# Patient Record
Sex: FEMALE | Race: WHITE | URBAN - METROPOLITAN AREA
[De-identification: names, ages, dates, MRNs, and addresses within clinical notes are randomized per-mention and may not be internally consistent; named-entity substitution may affect disease eponyms.]

---

## 2020-12-16 VITALS — WEIGHT: 230 LBS | BODY MASS INDEX: 36.96 KG/M2 | HEIGHT: 66 IN

## 2020-12-16 PROBLEM — F41.8 MIXED ANXIETY AND DEPRESSIVE DISORDER: Status: ACTIVE | Noted: 2020-12-16

## 2020-12-16 RX ORDER — SERTRALINE HYDROCHLORIDE 50 MG/1
TABLET, FILM COATED ORAL DAILY
COMMUNITY
End: 2020-12-24 | Stop reason: SDUPTHER

## 2020-12-16 RX ORDER — PREDNISONE 20 MG/1
TABLET ORAL
COMMUNITY
End: 2021-01-15

## 2020-12-21 DIAGNOSIS — F41.8 OTHER SPECIFIED ANXIETY DISORDERS: ICD-10-CM

## 2020-12-21 RX ORDER — SERTRALINE HYDROCHLORIDE 50 MG/1
TABLET, FILM COATED ORAL
Qty: 90 TAB | Refills: 1 | OUTPATIENT
Start: 2020-12-21

## 2020-12-24 RX ORDER — SERTRALINE HYDROCHLORIDE 50 MG/1
50 TABLET, FILM COATED ORAL DAILY
Qty: 30 TAB | Refills: 0 | Status: SHIPPED | OUTPATIENT
Start: 2020-12-24 | End: 2021-01-15 | Stop reason: SDUPTHER

## 2021-01-15 ENCOUNTER — VIRTUAL VISIT (OUTPATIENT)
Dept: PRIMARY CARE CLINIC | Age: 41
End: 2021-01-15
Payer: COMMERCIAL

## 2021-01-15 DIAGNOSIS — F41.8 MIXED ANXIETY AND DEPRESSIVE DISORDER: Primary | Chronic | ICD-10-CM

## 2021-01-15 PROCEDURE — 99213 OFFICE O/P EST LOW 20 MIN: CPT | Performed by: FAMILY MEDICINE

## 2021-01-15 RX ORDER — SERTRALINE HYDROCHLORIDE 50 MG/1
75 TABLET, FILM COATED ORAL DAILY
Qty: 135 TAB | Refills: 1 | Status: SHIPPED | OUTPATIENT
Start: 2021-01-15 | End: 2021-12-13

## 2021-01-15 RX ORDER — SERTRALINE HYDROCHLORIDE 50 MG/1
50 TABLET, FILM COATED ORAL DAILY
Qty: 90 TAB | Refills: 1 | Status: SHIPPED | OUTPATIENT
Start: 2021-01-15 | End: 2021-01-15

## 2021-01-15 NOTE — PROGRESS NOTES
HISTORY OF PRESENT ILLNESS  Bennie Hdz is a 36 y.o. female   THIS IS  VIDEO VISIT  OCCURRED WITH CONSENT FORM THE PATIENT AND PERFORMED THROUGH THE SECURE WEBSITE [employer]. ME.     Needs refills for sertraline , works well since restarted the zoloft. No SI, HI. Not seeing a counselor but feels she is coping well  A bit  unsettled bc of world events. Teaches online now. not as rested but its just a lot to do. Put on weight and  On the computer and home has made her snack more. Plans to take covid vaccine when it comes out. Prior to Admission medications    Medication Sig Start Date End Date Taking? Authorizing Provider   sertraline (ZOLOFT) 50 mg tablet Take 1.5 Tabs by mouth daily. 1/15/21  Yes Silverio Enrique MD        Past Medical History:   Diagnosis Date    Depression      Social History     Tobacco Use    Smoking status: Never Smoker    Smokeless tobacco: Never Used   Substance Use Topics    Alcohol use: Not on file    Drug use: Not on file       Family History   Problem Relation Age of Onset    Diabetes Mother     Cancer Father     Crohn's Disease Father        Review of Systems   Constitutional: Negative for chills, fever, malaise/fatigue and weight loss. Respiratory: Negative for cough and shortness of breath. Cardiovascular: Negative for chest pain and palpitations. Gastrointestinal: Negative for abdominal pain and heartburn. Musculoskeletal: Negative for myalgias. Skin: Negative for rash. Neurological: Negative for dizziness, tingling, tremors, speech change, weakness and headaches. Psychiatric/Behavioral: Positive for depression. Negative for memory loss and suicidal ideas. The patient is nervous/anxious. The patient does not have insomnia. There were no vitals taken for this visit. Physical Exam  Nursing note reviewed. Constitutional:       Appearance: Normal appearance. She is obese. HENT:      Head: Normocephalic and atraumatic. Eyes:      Extraocular Movements: Extraocular movements intact. Conjunctiva/sclera: Conjunctivae normal.   Pulmonary:      Effort: Pulmonary effort is normal. No respiratory distress. Abdominal:      Palpations: Abdomen is soft. Neurological:      General: No focal deficit present. Mental Status: She is alert and oriented to person, place, and time. Psychiatric:         Mood and Affect: Mood normal.         Behavior: Behavior normal.         Thought Content: Thought content normal.         Judgment: Judgment normal.         ASSESSMENT and PLAN  Diagnoses and all orders for this visit:    1. Mixed anxiety and depressive disorder  Comments:  Incerase to 75 mg zoloft.   suggested counseling to see if helps. Overall has improved    Other orders  -     sertraline (ZOLOFT) 50 mg tablet; Take 1.5 Tabs by mouth daily.          Orders Placed This Encounter    DISCONTD: sertraline (ZOLOFT) 50 mg tablet    sertraline (ZOLOFT) 50 mg tablet

## 2021-12-13 ENCOUNTER — OFFICE VISIT (OUTPATIENT)
Dept: PRIMARY CARE CLINIC | Age: 41
End: 2021-12-13
Payer: COMMERCIAL

## 2021-12-13 VITALS
HEIGHT: 66 IN | RESPIRATION RATE: 14 BRPM | SYSTOLIC BLOOD PRESSURE: 136 MMHG | DIASTOLIC BLOOD PRESSURE: 88 MMHG | WEIGHT: 254 LBS | BODY MASS INDEX: 40.82 KG/M2 | OXYGEN SATURATION: 98 % | HEART RATE: 99 BPM

## 2021-12-13 DIAGNOSIS — Z00.00 PREVENTATIVE HEALTH CARE: ICD-10-CM

## 2021-12-13 DIAGNOSIS — F41.8 MIXED ANXIETY AND DEPRESSIVE DISORDER: ICD-10-CM

## 2021-12-13 DIAGNOSIS — Z23 ENCOUNTER FOR IMMUNIZATION: Primary | ICD-10-CM

## 2021-12-13 DIAGNOSIS — Z76.89 ENCOUNTER TO ESTABLISH CARE: ICD-10-CM

## 2021-12-13 PROCEDURE — 99214 OFFICE O/P EST MOD 30 MIN: CPT

## 2021-12-13 PROCEDURE — 90686 IIV4 VACC NO PRSV 0.5 ML IM: CPT

## 2021-12-13 PROCEDURE — 90472 IMMUNIZATION ADMIN EACH ADD: CPT

## 2021-12-13 PROCEDURE — 90715 TDAP VACCINE 7 YRS/> IM: CPT

## 2021-12-13 PROCEDURE — 90471 IMMUNIZATION ADMIN: CPT

## 2021-12-13 RX ORDER — SERTRALINE HYDROCHLORIDE 50 MG/1
75 TABLET, FILM COATED ORAL DAILY
Qty: 135 TABLET | Refills: 1 | Status: SHIPPED | OUTPATIENT
Start: 2021-12-13 | End: 2022-07-20 | Stop reason: SDUPTHER

## 2021-12-13 NOTE — PROGRESS NOTES
HPI     Chief Complaint   Patient presents with    New Patient    Medication Refill     zoloft        HPI:  Sonam Graff is a 39 y.o. female who presents the office today for physical and prescription refill. Mixed anxiety and depressive disorder: Depression PHQ9 score: 5 Mild Depression. Not taking meds/ran out of medication 30 days ago. No side effects. Not attending psychotherapy. Establish GYN Services: Patient wanting to establish local GYN services. [place] [address] [place] [address] [place], [address] [facility] Last PDMP Nadir Flores as Reviewed:  Review User Review Instant Review Result   Manuel Maki 12/13/2021  8:25 PM Reviewed PDMP [1]       No Known Allergies    Current Outpatient Medications   Medication Sig    sertraline (ZOLOFT) 50 mg tablet Take 1.5 Tablets by mouth daily. No current facility-administered medications for this visit. Review of Systems   Constitutional: Negative for chills, fever, malaise/fatigue and weight loss. Eyes: Negative for blurred vision and double vision. Respiratory: Negative for cough, shortness of breath and wheezing. Cardiovascular: Negative for chest pain, palpitations and leg swelling. Gastrointestinal: Negative for diarrhea, heartburn, nausea and vomiting. Genitourinary: Negative for dysuria. Musculoskeletal: Negative for joint pain and myalgias. Skin: Negative for itching and rash. Neurological: Negative for dizziness, tingling, loss of consciousness, weakness and headaches. Endo/Heme/Allergies: Does not bruise/bleed easily. Psychiatric/Behavioral: Negative for depression. The patient is not nervous/anxious. Reviewed PmHx, FmHx, SocHx as well as meds and allergies, updated and dated in the chart.          Objective     Visit Vitals  /88 (BP 1 Location: Left upper arm, BP Patient Position: Sitting)   Pulse 99   Resp 14   Ht 5' 6\" (1.676 m)   Wt 254 lb (115.2 kg)   SpO2 98%   BMI 41.00 kg/m²     Physical Exam  Constitutional: Appearance: Normal appearance. She is normal weight. HENT:      Head: Normocephalic. Eyes:      Extraocular Movements: Extraocular movements intact. Conjunctiva/sclera: Conjunctivae normal.      Pupils: Pupils are equal, round, and reactive to light. Cardiovascular:      Rate and Rhythm: Normal rate and regular rhythm. Pulses: Normal pulses. Heart sounds: Normal heart sounds. Pulmonary:      Effort: Pulmonary effort is normal.      Breath sounds: Normal breath sounds. Musculoskeletal:         General: Normal range of motion. Cervical back: Normal range of motion and neck supple. Skin:     General: Skin is warm and dry. Neurological:      General: No focal deficit present. Mental Status: She is alert and oriented to person, place, and time. Psychiatric:         Mood and Affect: Mood is anxious. Speech: Speech normal.         Behavior: Behavior is cooperative. Thought Content: Thought content normal. Thought content does not include homicidal or suicidal ideation. Cognition and Memory: Cognition normal.             Assessment and Plan     Diagnoses and all orders for this visit:    1. Encounter for immunization  -     TETANUS, DIPHTHERIA TOXOIDS AND ACELLULAR PERTUSSIS VACCINE (TDAP), IN INDIVIDS. >=7, IM  -     INFLUENZA VIRUS VAC QUAD,SPLIT,PRESV FREE SYRINGE IM    2. Mixed anxiety and depressive disorder  Assessment & Plan:   uncontrolled, continue current medications pending work up below. Patient has been out of her medications for one month. Has a good support system and is able to seek help when needed. Patient feels well controlled on medications. Plan: Refill Zoloft prescription. Patient has office number in the event condition worsens. Orders:  -     sertraline (ZOLOFT) 50 mg tablet; Take 1.5 Tablets by mouth daily. 3. Encounter to establish care -        METABOLIC PANEL, COMPREHENSIVE; Future  -     CBC W/O DIFF; Future    4. Preventative health care  -     REFERRAL TO GYNECOLOGWants to establish current and future GYN  services near her home in [place], [place] if possible  -     REFERRAL TO OPTOMETRY: Patient to review current insurance and list of  local participating providers. Medication Side Effects and Warnings were discussed with patient. Patient Labs were reviewed and or requested. Patient Past Records were reviewed and or requested. Follow-up and Dispositions    · Return in about 6 months (around 6/13/2022). I have discussed the diagnosis with the patient and the intended plan as seen in the above orders. The patient has received an after-visit summary and questions were answered concerning future plans. I have discussed medication side effects and warnings with the patient as well. Deion Mtz, [facility]  [address]

## 2021-12-13 NOTE — PATIENT INSTRUCTIONS

## 2021-12-13 NOTE — PROGRESS NOTES
Rahul Her is a 39 y.o. female    Chief Complaint   Patient presents with    New Patient    Medication Refill     zoloft       Health Maintenance Due   Topic Date Due    Hepatitis C Screening  Never done    COVID-19 Vaccine (1) Never done    DTaP/Tdap/Td series (1 - Tdap) Never done    Cervical cancer screen  Never done    Lipid Screen  Never done    Flu Vaccine (1) 09/01/2021       Visit Vitals  /88 (BP 1 Location: Left upper arm, BP Patient Position: Sitting)   Pulse 99   Resp 14   Ht 5' 6\" (1.676 m)   Wt 254 lb (115.2 kg)   SpO2 98%   BMI 41.00 kg/m²       No flowsheet data found. No flowsheet data found. Abuse Screening Questionnaire 12/13/2021   Do you ever feel afraid of your partner? N   Are you in a relationship with someone who physically or mentally threatens you? N   Is it safe for you to go home? Y         1. Have you been to the ER, urgent care clinic since your last visit? Hospitalized since your last visit?no    2. Have you seen or consulted any other health care providers outside of the [address] [place] [address] since your last visit? Include any pap smears or colon screening. yes dentist

## 2021-12-13 NOTE — ASSESSMENT & PLAN NOTE
uncontrolled, continue current medications pending work up below. Patient has been out of her medications for one month. Has a good support system and is able to seek help when needed. Patient feels well controlled on medications.      Plan: Refill Zoloft prescription

## 2021-12-14 LAB
ALBUMIN SERPL-MCNC: 4.6 G/DL (ref 4–5)
ALBUMIN/GLOB SERPL: 2.2 {RATIO} (ref 1.2–2.2)
ALP SERPL-CCNC: 65 IU/L (ref 44–121)
ALT SERPL-CCNC: 10 IU/L (ref 0–44)
AST SERPL-CCNC: 13 IU/L (ref 0–40)
BILIRUB SERPL-MCNC: 0.3 MG/DL (ref 0–1.2)
BUN SERPL-MCNC: 10 MG/DL (ref 6–24)
BUN/CREAT SERPL: 13 (ref 9–20)
CALCIUM SERPL-MCNC: 9.3 MG/DL (ref 8.7–10.2)
CHLORIDE SERPL-SCNC: 103 MMOL/L (ref 96–106)
CO2 SERPL-SCNC: 24 MMOL/L (ref 20–29)
CREAT SERPL-MCNC: 0.75 MG/DL (ref 0.76–1.27)
ERYTHROCYTE [DISTWIDTH] IN BLOOD BY AUTOMATED COUNT: 13.1 % (ref 11.6–15.4)
GLOBULIN SER CALC-MCNC: 2.1 G/DL (ref 1.5–4.5)
GLUCOSE SERPL-MCNC: 101 MG/DL (ref 65–99)
HCT VFR BLD AUTO: 42.8 % (ref 37.5–51)
HGB BLD-MCNC: 14.1 G/DL (ref 13–17.7)
MCH RBC QN AUTO: 27.9 PG (ref 26.6–33)
MCHC RBC AUTO-ENTMCNC: 32.9 G/DL (ref 31.5–35.7)
MCV RBC AUTO: 85 FL (ref 79–97)
PLATELET # BLD AUTO: 257 X10E3/UL (ref 150–450)
POTASSIUM SERPL-SCNC: 4.1 MMOL/L (ref 3.5–5.2)
PROT SERPL-MCNC: 6.7 G/DL (ref 6–8.5)
RBC # BLD AUTO: 5.06 X10E6/UL (ref 4.14–5.8)
SODIUM SERPL-SCNC: 142 MMOL/L (ref 134–144)
WBC # BLD AUTO: 8.2 X10E3/UL (ref 3.4–10.8)

## 2022-03-19 PROBLEM — F41.8 MIXED ANXIETY AND DEPRESSIVE DISORDER: Status: ACTIVE | Noted: 2020-12-16

## 2022-05-24 ENCOUNTER — OFFICE VISIT (OUTPATIENT)
Dept: PRIMARY CARE CLINIC | Age: 42
End: 2022-05-24
Payer: COMMERCIAL

## 2022-05-24 VITALS
HEART RATE: 81 BPM | HEIGHT: 66 IN | BODY MASS INDEX: 41.98 KG/M2 | WEIGHT: 261.2 LBS | DIASTOLIC BLOOD PRESSURE: 75 MMHG | OXYGEN SATURATION: 98 % | RESPIRATION RATE: 16 BRPM | SYSTOLIC BLOOD PRESSURE: 126 MMHG

## 2022-05-24 DIAGNOSIS — M54.16 LUMBAR RADICULOPATHY: Primary | ICD-10-CM

## 2022-05-24 DIAGNOSIS — E66.01 OBESITY, CLASS III, BMI 40-49.9 (MORBID OBESITY) (HCC): ICD-10-CM

## 2022-05-24 PROCEDURE — 99214 OFFICE O/P EST MOD 30 MIN: CPT | Performed by: FAMILY MEDICINE

## 2022-05-24 RX ORDER — CYCLOBENZAPRINE HCL 5 MG
5 TABLET ORAL
Qty: 15 TABLET | Refills: 0 | Status: SHIPPED | OUTPATIENT
Start: 2022-05-24 | End: 2022-09-08 | Stop reason: ALTCHOICE

## 2022-05-24 RX ORDER — KETOROLAC TROMETHAMINE 10 MG/1
TABLET, FILM COATED ORAL
Qty: 20 TABLET | Refills: 0 | Status: CANCELLED | OUTPATIENT
Start: 2022-05-24

## 2022-05-24 RX ORDER — METHYLPREDNISOLONE 4 MG/1
TABLET ORAL
Qty: 1 DOSE PACK | Refills: 0 | Status: SHIPPED | OUTPATIENT
Start: 2022-05-24 | End: 2022-09-08 | Stop reason: ALTCHOICE

## 2022-05-24 NOTE — PROGRESS NOTES
Ibis Crawley (: 1980) is a 39 y.o. female, established patient, here for evaluation of the following chief complaint(s): Other (Back pain/nerve pain down femur-shooting pain and numbness in right leg )       ASSESSMENT/PLAN:  Below is the assessment and plan developed based on review of pertinent history, physical exam, labs, studies, and medications. 1. Lumbar radiculopathy  Acute on chronic  -     methylPREDNISolone (MEDROL DOSEPACK) 4 mg tablet; Follow instructions per package, Normal, Disp-1 Dose Pack, R-0  -     cyclobenzaprine (FLEXERIL) 5 mg tablet; Take 1 Tablet by mouth three (3) times daily as needed for Muscle Spasm(s). , Normal, Disp-15 Tablet, R-0  -     MRI LUMB SPINE WO CONT; Future  MRI to rule out compression, Medrol Dosepak to reduce inflammation, muscle relaxants and continue diclofenac. Patient to start physical therapy soon. Danger signs and symptoms reviewed and when to seek immediate medical attention. Patient verbalized understanding of our discussion. 2. Obesity, Class III, BMI 40-49.9 (morbid obesity) ([facility] [address]  Chronic    Return if symptoms worsen or fail to improve. SUBJECTIVE/OBJECTIVE:  HPI    Patient presenting for evaluation of low back pain, pain down leg. Onset of symptoms was 4 weeks ago. Patient describes right, lower leg pain. Pain is moderate in severity. Patient has associated symptoms of numbness, tingling, weakness, shooting pain down leg. Patient does not have symptoms of bruising, swelling, redness, bleeding, bowel / bladder incontinence, fever, weight loss. Patient has had no reported trauma to the neck, had no prior back surgeries. Treatment prior to arrival includes diclofenac. Patient went to Ortho walk-in last month, diagnosed with DDD and lumbar radiculopathy on XR. Conservative treatment was recommended including PT which patient will start soon. She has been taking diclofenac without relief.  Pain is shooting down right leg causing numbness, tingling, weakness is progressing. No Known Allergies  Current Outpatient Medications   Medication Sig    methylPREDNISolone (MEDROL DOSEPACK) 4 mg tablet Follow instructions per package    cyclobenzaprine (FLEXERIL) 5 mg tablet Take 1 Tablet by mouth three (3) times daily as needed for Muscle Spasm(s).  sertraline (ZOLOFT) 50 mg tablet Take 1.5 Tablets by mouth daily. No current facility-administered medications for this visit. Past Medical History:   Diagnosis Date    Depression      Past Surgical History:   Procedure Laterality Date    HX HEENT      Extraction of wisdom tooth     Family History   Problem Relation Age of Onset    Diabetes Mother     Cancer Father     Crohn's Disease Father      Social History     Tobacco Use   Smoking Status Never Smoker   Smokeless Tobacco Never Used         Review of Systems   All other systems reviewed and are negative. /75 (BP 1 Location: Left upper arm, BP Patient Position: Sitting, BP Cuff Size: Adult)   Pulse 81   Resp 16   Ht 5' 6\" (1.676 m)   Wt 261 lb 3.2 oz (118.5 kg)   SpO2 98%   BMI 42.16 kg/m²    Physical Exam  Vitals reviewed. Constitutional:       Appearance: Normal appearance. HENT:      Head: Normocephalic and atraumatic. Cardiovascular:      Rate and Rhythm: Normal rate and regular rhythm. Heart sounds: Normal heart sounds. Musculoskeletal:      Cervical back: Neck supple. Lumbar back: Positive right straight leg raise test. Negative left straight leg raise test.      Right hip: Normal.      Left hip: Normal.      Right knee: Normal.      Left knee: Normal.      Right lower leg: Normal.      Left lower leg: Normal.      Right ankle: Normal.      Left ankle: Normal.   Neurological:      Mental Status: She is alert.              On this date 05/24/2022 I have spent 30 minutes reviewing previous notes, test results and face to face with the patient discussing the diagnosis and importance of compliance with the treatment plan as well as documenting on the day of the visit. An electronic signature was used to authenticate this note.   -- MD Chet Sanchez [id number]

## 2022-05-24 NOTE — PROGRESS NOTES
Chief Complaint   Patient presents with    Other     Back pain/nerve pain down femur-shooting pain and numbness in right leg      Visit Vitals  /75 (BP 1 Location: Left upper arm, BP Patient Position: Sitting, BP Cuff Size: Adult)   Pulse 81   Resp 16   Ht 5' 6\" (1.676 m)   Wt 261 lb 3.2 oz (118.5 kg)   SpO2 98%   BMI 42.16 kg/m²     1. \"Have you been to the ER, urgent care clinic since your last visit? Hospitalized since your last visit? \" Yes When: April 2022    2. \"Have you seen or consulted any other health care providers outside of the [address] [place] [address] since your last visit? \" Yes Where: Urgent care      3. For patients aged 39-70: Has the patient had a colonoscopy / FIT/ Cologuard? No      If the patient is female:    4. For patients aged 41-77: Has the patient had a mammogram within the past 2 years? No      5. For patients aged 21-65: Has the patient had a pap smear?  No

## 2022-07-20 DIAGNOSIS — F41.8 MIXED ANXIETY AND DEPRESSIVE DISORDER: ICD-10-CM

## 2022-07-21 RX ORDER — SERTRALINE HYDROCHLORIDE 50 MG/1
75 TABLET, FILM COATED ORAL DAILY
Qty: 135 TABLET | Refills: 0 | Status: SHIPPED | OUTPATIENT
Start: 2022-07-21 | End: 2022-10-31

## 2022-09-08 ENCOUNTER — OFFICE VISIT (OUTPATIENT)
Dept: PRIMARY CARE CLINIC | Age: 42
End: 2022-09-08
Payer: COMMERCIAL

## 2022-09-08 VITALS
HEART RATE: 82 BPM | BODY MASS INDEX: 41.75 KG/M2 | HEIGHT: 66 IN | DIASTOLIC BLOOD PRESSURE: 78 MMHG | WEIGHT: 259.8 LBS | OXYGEN SATURATION: 98 % | SYSTOLIC BLOOD PRESSURE: 121 MMHG | RESPIRATION RATE: 16 BRPM

## 2022-09-08 DIAGNOSIS — F41.8 MIXED ANXIETY AND DEPRESSIVE DISORDER: Primary | ICD-10-CM

## 2022-09-08 DIAGNOSIS — Z13.220 LIPID SCREENING: ICD-10-CM

## 2022-09-08 DIAGNOSIS — E66.01 MORBID OBESITY (HCC): ICD-10-CM

## 2022-09-08 DIAGNOSIS — Z13.29 THYROID DISORDER SCREENING: ICD-10-CM

## 2022-09-08 PROCEDURE — 99214 OFFICE O/P EST MOD 30 MIN: CPT | Performed by: FAMILY MEDICINE

## 2022-09-08 NOTE — PROGRESS NOTES
Angel Reynaga (: 1980) is a 39 y.o. female, established patient, here for evaluation of the following chief complaint(s):  Follow-up (Medication follow up )       ASSESSMENT/PLAN:  Below is the assessment and plan developed based on review of pertinent history, physical exam, labs, studies, and medications. 1. Mixed anxiety and depressive disorder  Chronic, stable  -     CBC WITH AUTOMATED DIFF  -     METABOLIC PANEL, COMPREHENSIVE  Continue sertraline 25 mg daily, no psychosocial stressors currently. 2. Morbid obesity ([address]  Chronic  Discussed weight management in detail and pharmacologics to assist in weight loss. Patient will work harder on diet control and exercise over the next 6 months. Rediscuss need for medication if weight reduction fails. Patient not interested in nonconservative measures for weight loss  3. Lipid screening  -     LIPID PANEL  4. Thyroid disorder screening  -     TSH RFX ON ABNORMAL TO FREE T4      Return in about 6 months (around 3/8/2023) for chronic care follow up. SUBJECTIVE/OBJECTIVE:  HPI    Patient presenting for evaluation of depression follow up, anxiety follow up. Symptoms have been stable on current treatment plan. Patient denies symptoms of depressed mood, poor concentraction, poor sleep, increased sleep, worthlessness, anxiety, fatigue, thoughts of suicide, hallucinations. Patient current treatments include medications. Patient has had no thoughts of suicide, no drug use, and no medication side effects. No Known Allergies  Current Outpatient Medications   Medication Sig    sertraline (ZOLOFT) 50 mg tablet Take 1.5 Tablets by mouth in the morning. No current facility-administered medications for this visit.      Past Medical History:   Diagnosis Date    Depression      Past Surgical History:   Procedure Laterality Date    HX HEENT      Extraction of wisdom tooth     Family History   Problem Relation Age of Onset    Diabetes Mother     Cancer Father     Crohn's Disease Father      Social History     Tobacco Use   Smoking Status Never   Smokeless Tobacco Never         Review of Systems   All other systems reviewed and are negative. /78 (BP 1 Location: Left upper arm, BP Patient Position: Sitting, BP Cuff Size: Adult)   Pulse 82   Resp 16   Ht 5' 6\" (1.676 m)   Wt 259 lb 12.8 oz (117.8 kg)   SpO2 98%   BMI 41.93 kg/m²    Physical Exam  Vitals reviewed. Constitutional:       Appearance: She is obese. HENT:      Head: Normocephalic and atraumatic. Cardiovascular:      Rate and Rhythm: Normal rate and regular rhythm. Pulses: Normal pulses. Heart sounds: Normal heart sounds. Pulmonary:      Effort: Pulmonary effort is normal.      Breath sounds: Normal breath sounds. Abdominal:      General: Bowel sounds are normal.      Palpations: Abdomen is soft. Musculoskeletal:      Cervical back: Neck supple. Skin:     Capillary Refill: Capillary refill takes less than 2 seconds. Neurological:      General: No focal deficit present. Mental Status: She is alert. On this date 09/08/2022 I have spent 30 minutes reviewing previous notes, test results and face to face with the patient discussing the diagnosis and importance of compliance with the treatment plan as well as documenting on the day of the visit. An electronic signature was used to authenticate this note.   -- Nani Hull MD   [facility] [id number]  [address]

## 2022-09-08 NOTE — PROGRESS NOTES
Chief Complaint   Patient presents with    Follow-up     Medication follow up      Visit Vitals  /78 (BP 1 Location: Left upper arm, BP Patient Position: Sitting, BP Cuff Size: Adult)   Pulse 82   Resp 16   Ht 5' 6\" (1.676 m)   Wt 259 lb 12.8 oz (117.8 kg)   SpO2 98%   BMI 41.93 kg/m²     1. \"Have you been to the ER, urgent care clinic since your last visit? Hospitalized since your last visit? \" No    2. \"Have you seen or consulted any other health care providers outside of the [address] [place] [address] since your last visit? \" No     3. For patients aged 39-70: Has the patient had a colonoscopy / FIT/ Cologuard? No      If the patient is female:    4. For patients aged 41-77: Has the patient had a mammogram within the past 2 years? NA - based on age or sex      11. For patients aged 21-65: Has the patient had a pap smear?  NA - based on age or sex

## 2022-10-31 DIAGNOSIS — F41.8 MIXED ANXIETY AND DEPRESSIVE DISORDER: ICD-10-CM

## 2022-10-31 RX ORDER — SERTRALINE HYDROCHLORIDE 50 MG/1
TABLET, FILM COATED ORAL
Qty: 135 TABLET | Refills: 0 | Status: SHIPPED | OUTPATIENT
Start: 2022-10-31

## 2023-01-17 DIAGNOSIS — F41.8 MIXED ANXIETY AND DEPRESSIVE DISORDER: ICD-10-CM

## 2023-01-18 RX ORDER — SERTRALINE HYDROCHLORIDE 50 MG/1
TABLET, FILM COATED ORAL
Qty: 135 TABLET | Refills: 0 | Status: SHIPPED | OUTPATIENT
Start: 2023-01-18

## 2023-03-09 NOTE — TELEPHONE ENCOUNTER
Medication refill NOV 9/8/22
PT is stating that she has already picked up the one refill and would like to  before she heads out of town.
Pt called to schedule an appointment, stated her PCP left the office. She needs a refill on sertraline. She will be going out of town for a few weeks.
0 (no pain/absence of nonverbal indicators of pain)

## 2023-05-18 DIAGNOSIS — F41.8 OTHER SPECIFIED ANXIETY DISORDERS: ICD-10-CM

## 2023-06-19 ENCOUNTER — TELEPHONE (OUTPATIENT)
Dept: PRIMARY CARE CLINIC | Facility: CLINIC | Age: 43
End: 2023-06-19

## 2023-06-19 NOTE — TELEPHONE ENCOUNTER
----- Message from Otf Piedra LPN sent at 9/35/3958  2:33 PM EDT -----  Regarding: FW: Refill of sertraline  Contact: [phone]    ----- Message -----  From: Cristian Aden  Sent: 6/15/2023  11:00 AM EDT  To: [address] Clinical Staff  Subject: Refill of sertraline                             Dr. Asa Bowles,   [facility] contacted me saying that my refill request was denied by your office. Can you give me more information on that? Do I need to come for an appointment first? Would a telehealth appointment be sufficient? Please advise.     Thank you,  Delmy Zabala

## 2023-07-07 SDOH — ECONOMIC STABILITY: HOUSING INSECURITY
IN THE LAST 12 MONTHS, WAS THERE A TIME WHEN YOU DID NOT HAVE A STEADY PLACE TO SLEEP OR SLEPT IN A SHELTER (INCLUDING NOW)?: NO

## 2023-07-07 SDOH — ECONOMIC STABILITY: INCOME INSECURITY: HOW HARD IS IT FOR YOU TO PAY FOR THE VERY BASICS LIKE FOOD, HOUSING, MEDICAL CARE, AND HEATING?: NOT HARD AT ALL

## 2023-07-07 SDOH — ECONOMIC STABILITY: FOOD INSECURITY: WITHIN THE PAST 12 MONTHS, YOU WORRIED THAT YOUR FOOD WOULD RUN OUT BEFORE YOU GOT MONEY TO BUY MORE.: NEVER TRUE

## 2023-07-07 SDOH — ECONOMIC STABILITY: TRANSPORTATION INSECURITY
IN THE PAST 12 MONTHS, HAS LACK OF TRANSPORTATION KEPT YOU FROM MEETINGS, WORK, OR FROM GETTING THINGS NEEDED FOR DAILY LIVING?: NO

## 2023-07-07 SDOH — ECONOMIC STABILITY: FOOD INSECURITY: WITHIN THE PAST 12 MONTHS, THE FOOD YOU BOUGHT JUST DIDN'T LAST AND YOU DIDN'T HAVE MONEY TO GET MORE.: NEVER TRUE

## 2023-07-10 ENCOUNTER — TELEMEDICINE (OUTPATIENT)
Dept: PRIMARY CARE CLINIC | Facility: CLINIC | Age: 43
End: 2023-07-10
Payer: COMMERCIAL

## 2023-07-10 ENCOUNTER — CLINICAL DOCUMENTATION (OUTPATIENT)
Age: 43
End: 2023-07-10

## 2023-07-10 DIAGNOSIS — F41.8 MIXED ANXIETY AND DEPRESSIVE DISORDER: Primary | ICD-10-CM

## 2023-07-10 DIAGNOSIS — Z11.59 ENCOUNTER FOR HEPATITIS C SCREENING TEST FOR LOW RISK PATIENT: ICD-10-CM

## 2023-07-10 DIAGNOSIS — Z12.4 PAP SMEAR FOR CERVICAL CANCER SCREENING: ICD-10-CM

## 2023-07-10 DIAGNOSIS — Z13.220 LIPID SCREENING: ICD-10-CM

## 2023-07-10 DIAGNOSIS — Z13.29 THYROID DISORDER SCREENING: ICD-10-CM

## 2023-07-10 DIAGNOSIS — R53.83 FATIGUE, UNSPECIFIED TYPE: ICD-10-CM

## 2023-07-10 DIAGNOSIS — E66.01 MORBID OBESITY (HCC): ICD-10-CM

## 2023-07-10 PROCEDURE — 99214 OFFICE O/P EST MOD 30 MIN: CPT | Performed by: FAMILY MEDICINE

## 2023-07-10 RX ORDER — SERTRALINE HYDROCHLORIDE 100 MG/1
100 TABLET, FILM COATED ORAL DAILY
Qty: 90 TABLET | Refills: 1 | Status: SHIPPED | OUTPATIENT
Start: 2023-07-10

## 2023-07-10 ASSESSMENT — PATIENT HEALTH QUESTIONNAIRE - PHQ9
SUM OF ALL RESPONSES TO PHQ QUESTIONS 1-9: 0
2. FEELING DOWN, DEPRESSED OR HOPELESS: 0
1. LITTLE INTEREST OR PLEASURE IN DOING THINGS: 0
SUM OF ALL RESPONSES TO PHQ QUESTIONS 1-9: 0
SUM OF ALL RESPONSES TO PHQ9 QUESTIONS 1 & 2: 0
SUM OF ALL RESPONSES TO PHQ QUESTIONS 1-9: 0
SUM OF ALL RESPONSES TO PHQ QUESTIONS 1-9: 0

## 2023-07-10 NOTE — PROGRESS NOTES
Dickson Boss (:  1980) is a Established patient, presenting virtually for evaluation of the following: Chronic care follow-up. Assessment & Plan   Below is the assessment and plan developed based on review of pertinent history, physical exam, labs, studies, and medications. 1. Mixed anxiety and depressive disorder  Chronic  -     Comprehensive Metabolic Panel  -     CBC  -     sertraline (ZOLOFT) 100 MG tablet; Take 1 tablet by mouth daily, Disp-90 tablet, R-1Normal  Patient's chronic fatigue and poor sleep quality may be contributing to her lack of motivation and difficulty with weight loss. Possible sleep apnea, thyroid condition, or inadequate medication management for her anxiety and depression are considered. Increase sertraline 75 mg to 100 mg to daily. Monitor for changes in mood and energy levels. Home sleep study to rule out sleep apnea. Blood work to screen for thyroid condition, check CMP, CBC, and blood glucose. 2. Fatigue, unspecified type  Chronic  -     Comprehensive Metabolic Panel  -     TSH + FREE T4 PANEL  -     External Referral To Sleep Medicine  -     CBC  3. Morbid obesity ([address])  4. Lipid screening  -     Lipid Panel  5. Encounter for hepatitis C screening test for low risk patient  -     Hepatitis C Antibody  6. Thyroid disorder screening  -     TSH + FREE T4 PANEL  7. Pap smear for cervical cancer screening  -     [facility] - Sally Ramos MD, Ob-Gyn, [place]  Refer patient to gynecologist for Pap smear. Return in about 6 months (around 1/10/2024) for chronic care follow-up. Subjective   HPI    20-year-old female with past medical history anxiety/depression and morbid obesity seen via telemedicine for chronic care follow-up. Patient reports feeling fatigued and lacking motivation throughout the day which she believes is hindering her weight loss efforts.   She also reports poor sleep quality, waking up easily, and not feeling refreshed in the

## 2023-07-27 LAB
ALBUMIN SERPL-MCNC: 4.4 G/DL (ref 3.9–4.9)
ALBUMIN/GLOB SERPL: 1.8 {RATIO} (ref 1.2–2.2)
ALP SERPL-CCNC: 67 IU/L (ref 44–121)
ALT SERPL-CCNC: 11 IU/L (ref 0–32)
AST SERPL-CCNC: 13 IU/L (ref 0–40)
BILIRUB SERPL-MCNC: 0.4 MG/DL (ref 0–1.2)
BUN SERPL-MCNC: 12 MG/DL (ref 6–24)
BUN/CREAT SERPL: 17 (ref 9–23)
CALCIUM SERPL-MCNC: 9.1 MG/DL (ref 8.7–10.2)
CHLORIDE SERPL-SCNC: 103 MMOL/L (ref 96–106)
CHOLEST SERPL-MCNC: 194 MG/DL (ref 100–199)
CO2 SERPL-SCNC: 25 MMOL/L (ref 20–29)
CREAT SERPL-MCNC: 0.69 MG/DL (ref 0.57–1)
EGFRCR SERPLBLD CKD-EPI 2021: 111 ML/MIN/1.73
ERYTHROCYTE [DISTWIDTH] IN BLOOD BY AUTOMATED COUNT: 13 % (ref 11.7–15.4)
GLOBULIN SER CALC-MCNC: 2.4 G/DL (ref 1.5–4.5)
GLUCOSE SERPL-MCNC: 93 MG/DL (ref 70–99)
HCT VFR BLD AUTO: 43.6 % (ref 34–46.6)
HCV IGG SERPL QL IA: NON REACTIVE
HDLC SERPL-MCNC: 60 MG/DL
HGB BLD-MCNC: 14.2 G/DL (ref 11.1–15.9)
LDLC SERPL CALC-MCNC: 115 MG/DL (ref 0–99)
MCH RBC QN AUTO: 27.3 PG (ref 26.6–33)
MCHC RBC AUTO-ENTMCNC: 32.6 G/DL (ref 31.5–35.7)
MCV RBC AUTO: 84 FL (ref 79–97)
PLATELET # BLD AUTO: 273 X10E3/UL (ref 150–450)
POTASSIUM SERPL-SCNC: 4.3 MMOL/L (ref 3.5–5.2)
PROT SERPL-MCNC: 6.8 G/DL (ref 6–8.5)
RBC # BLD AUTO: 5.2 X10E6/UL (ref 3.77–5.28)
SODIUM SERPL-SCNC: 140 MMOL/L (ref 134–144)
T4 FREE SERPL-MCNC: 0.98 NG/DL (ref 0.82–1.77)
TRIGL SERPL-MCNC: 108 MG/DL (ref 0–149)
TSH SERPL DL<=0.005 MIU/L-ACNC: 4.28 UIU/ML (ref 0.45–4.5)
VLDLC SERPL CALC-MCNC: 19 MG/DL (ref 5–40)
WBC # BLD AUTO: 5.9 X10E3/UL (ref 3.4–10.8)

## 2023-08-01 PROBLEM — E78.5 MILD HYPERLIPIDEMIA: Chronic | Status: ACTIVE | Noted: 2023-08-01

## 2023-08-24 NOTE — PROGRESS NOTES
Chief Complaint   Patient presents with    Follow-up     Med refills      1. Have you been to the ER, urgent care clinic since your last visit? Hospitalized since your last visit? No    2. Have you seen or consulted any other health care providers outside of the [address] [place] [address] since your last visit? No     3. For patients aged 43-73: Has the patient had a colonoscopy / FIT/ Cologuard? Yes-No Care Gap Present    If the patient is female:    4. For patients aged 43-66: Has the patient had a mammogram within the past 2 years? Yes-No Care Gap Present      5. For patients aged 21-65: Has the patient had a pap smear?   No    PHQ-9 Total Score: 0 (7/10/2023  7:16 AM) [facility] Counseling: I discussed with the patient the risks of risankizumab-rzaa including but not limited to immunosuppression, and serious infections.  The patient understands that monitoring is required including a PPD at baseline and must alert us or the primary physician if symptoms of infection or other concerning signs are noted.

## 2023-12-11 LAB — MAMMOGRAPHY, EXTERNAL: NORMAL

## 2023-12-31 DIAGNOSIS — F41.8 MIXED ANXIETY AND DEPRESSIVE DISORDER: ICD-10-CM

## 2024-01-02 ENCOUNTER — TELEPHONE (OUTPATIENT)
Dept: PRIMARY CARE CLINIC | Facility: CLINIC | Age: 44
End: 2024-01-02

## 2024-01-03 DIAGNOSIS — F41.8 MIXED ANXIETY AND DEPRESSIVE DISORDER: ICD-10-CM

## 2024-01-03 RX ORDER — SERTRALINE HYDROCHLORIDE 100 MG/1
100 TABLET, FILM COATED ORAL DAILY
Qty: 90 TABLET | Refills: 1 | OUTPATIENT
Start: 2024-01-03

## 2024-01-03 RX ORDER — SERTRALINE HYDROCHLORIDE 100 MG/1
100 TABLET, FILM COATED ORAL DAILY
Qty: 90 TABLET | Refills: 0 | Status: SHIPPED | OUTPATIENT
Start: 2024-01-03

## 2024-04-06 DIAGNOSIS — F41.8 MIXED ANXIETY AND DEPRESSIVE DISORDER: ICD-10-CM

## 2024-04-08 RX ORDER — SERTRALINE HYDROCHLORIDE 100 MG/1
100 TABLET, FILM COATED ORAL DAILY
Qty: 90 TABLET | Refills: 0 | Status: SHIPPED | OUTPATIENT
Start: 2024-04-08

## 2024-04-17 ENCOUNTER — OFFICE VISIT (OUTPATIENT)
Dept: PRIMARY CARE CLINIC | Facility: CLINIC | Age: 44
End: 2024-04-17
Payer: COMMERCIAL

## 2024-04-17 VITALS
OXYGEN SATURATION: 98 % | HEART RATE: 92 BPM | HEIGHT: 66 IN | TEMPERATURE: 98.2 F | WEIGHT: 248 LBS | DIASTOLIC BLOOD PRESSURE: 78 MMHG | BODY MASS INDEX: 39.86 KG/M2 | RESPIRATION RATE: 16 BRPM | SYSTOLIC BLOOD PRESSURE: 117 MMHG

## 2024-04-17 DIAGNOSIS — Z13.220 LIPID SCREENING: Primary | ICD-10-CM

## 2024-04-17 DIAGNOSIS — E66.01 OBESITY, CLASS III, BMI 40-49.9 (MORBID OBESITY) (HCC): ICD-10-CM

## 2024-04-17 DIAGNOSIS — F41.8 MIXED ANXIETY AND DEPRESSIVE DISORDER: ICD-10-CM

## 2024-04-17 DIAGNOSIS — J34.89 SINUS PAIN: ICD-10-CM

## 2024-04-17 PROCEDURE — 99214 OFFICE O/P EST MOD 30 MIN: CPT | Performed by: NURSE PRACTITIONER

## 2024-04-17 RX ORDER — SERTRALINE HYDROCHLORIDE 100 MG/1
100 TABLET, FILM COATED ORAL DAILY
Qty: 90 TABLET | Refills: 3 | Status: SHIPPED | OUTPATIENT
Start: 2024-04-17

## 2024-04-17 SDOH — ECONOMIC STABILITY: FOOD INSECURITY: WITHIN THE PAST 12 MONTHS, YOU WORRIED THAT YOUR FOOD WOULD RUN OUT BEFORE YOU GOT MONEY TO BUY MORE.: NEVER TRUE

## 2024-04-17 SDOH — ECONOMIC STABILITY: FOOD INSECURITY: WITHIN THE PAST 12 MONTHS, THE FOOD YOU BOUGHT JUST DIDN'T LAST AND YOU DIDN'T HAVE MONEY TO GET MORE.: NEVER TRUE

## 2024-04-17 SDOH — ECONOMIC STABILITY: INCOME INSECURITY: HOW HARD IS IT FOR YOU TO PAY FOR THE VERY BASICS LIKE FOOD, HOUSING, MEDICAL CARE, AND HEATING?: NOT HARD AT ALL

## 2024-04-17 ASSESSMENT — PATIENT HEALTH QUESTIONNAIRE - PHQ9
SUM OF ALL RESPONSES TO PHQ QUESTIONS 1-9: 0
1. LITTLE INTEREST OR PLEASURE IN DOING THINGS: NOT AT ALL
SUM OF ALL RESPONSES TO PHQ9 QUESTIONS 1 & 2: 0
2. FEELING DOWN, DEPRESSED OR HOPELESS: NOT AT ALL
SUM OF ALL RESPONSES TO PHQ QUESTIONS 1-9: 0

## 2024-04-17 ASSESSMENT — ENCOUNTER SYMPTOMS
SHORTNESS OF BREATH: 0
CHEST TIGHTNESS: 0
SINUS PAIN: 1

## 2024-04-17 NOTE — PATIENT INSTRUCTIONS
[facility] in [place]  Phone: [phone] [address], [address]  [place] [id number]

## 2024-04-17 NOTE — PROGRESS NOTES
Frances Boss is a 43 y.o. female who was seen in clinic today (4/17/2024).    Assessment & Plan:   Below is the assessment and plan developed based on review of pertinent history, physical exam, labs, studies, and medications.    1. Lipid screening  Comments:  will monitor lipid panel  Orders:  -     CBC  -     Comprehensive Metabolic Panel  -     Lipid Panel  2. Mixed anxiety and depressive disorder  Comments:  stable on zoloft, she still feels some anxiety.  Will continue on zoloft, not ready to reduce zoloft.  Orders:  -     sertraline (ZOLOFT) 100 MG tablet; Take 1 tablet by mouth daily, Disp-90 tablet, R-3Normal  -     TSH  3. Obesity, Class III, BMI 40-49.9 (morbid obesity) ([facility])  Comments:  encouarged to work on healthy diet and increase exercise to aid in overall well being.  Orders:  -     Vitamin D 25 Hydroxy  4. Sinus pain  Comments:  more likely viral.  discussed symptom management.  if no improvement in 10-14 days she will contact office for next steps      Return in about 1 year (around 4/17/2025) for Physical.    Subjective:   Frances was seen today for Medication Refill     Patient is new to me, previous PCP was Dr. Cornejo.     Depression/Anxiety:  Patient notes that her anxiety is stable on zoloft.  She reported she has daily stress from her job at [employer].  Patient reported that the zoloft helps her maintain.   Patient notes that she ran out of her zoloft in previous years and her anxiety reached a high point.  She reported she is an over thinker.      Sinus pain: Patient reported that she developed nasal congestion and left sinus pain x5 days.  Patient reported that most of the pain is in her left side of her sinus/ear.  Denies fever, sore throat or cough.   Patient has tried a nasal decongestant and tylenol/ibuprofen.  Reported that over the past day or two this is improving.     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   Kids at

## 2024-04-17 NOTE — PROGRESS NOTES
Chief Complaint   Patient presents with    Medication Refill     /78 (Site: Right Upper Arm, Position: Sitting)   Pulse 92   Temp 98.2 °F (36.8 °C) (Oral)   Resp 16   Ht 1.676 m (5' 6\")   Wt 112.5 kg (248 lb)   SpO2 98%   BMI 40.03 kg/m²      \"Have you been to the ER, urgent care clinic since your last visit?  Hospitalized since your last visit?\"    NO    “Have you seen or consulted any other health care providers outside of [facility] since your last visit?”    NO     “Have you had a pap smear?”    YES - Where: OB/GYN Nurse/CMA to request most recent records if not in the chart    No cervical cancer screening on file             Click Here for Release of Records Request

## 2024-04-18 LAB
25(OH)D3+25(OH)D2 SERPL-MCNC: 28.7 NG/ML (ref 30–100)
ALBUMIN SERPL-MCNC: 4.2 G/DL (ref 3.9–4.9)
ALBUMIN/GLOB SERPL: 1.8 {RATIO} (ref 1.2–2.2)
ALP SERPL-CCNC: 70 IU/L (ref 44–121)
ALT SERPL-CCNC: 10 IU/L (ref 0–32)
AST SERPL-CCNC: 11 IU/L (ref 0–40)
BILIRUB SERPL-MCNC: 0.2 MG/DL (ref 0–1.2)
BUN SERPL-MCNC: 11 MG/DL (ref 6–24)
BUN/CREAT SERPL: 15 (ref 9–23)
CALCIUM SERPL-MCNC: 9.6 MG/DL (ref 8.7–10.2)
CHLORIDE SERPL-SCNC: 102 MMOL/L (ref 96–106)
CHOLEST SERPL-MCNC: 175 MG/DL (ref 100–199)
CO2 SERPL-SCNC: 23 MMOL/L (ref 20–29)
CREAT SERPL-MCNC: 0.71 MG/DL (ref 0.57–1)
EGFRCR SERPLBLD CKD-EPI 2021: 108 ML/MIN/1.73
ERYTHROCYTE [DISTWIDTH] IN BLOOD BY AUTOMATED COUNT: 13.1 % (ref 11.7–15.4)
GLOBULIN SER CALC-MCNC: 2.4 G/DL (ref 1.5–4.5)
GLUCOSE SERPL-MCNC: 74 MG/DL (ref 70–99)
HCT VFR BLD AUTO: 43 % (ref 34–46.6)
HDLC SERPL-MCNC: 61 MG/DL
HGB BLD-MCNC: 13.6 G/DL (ref 11.1–15.9)
LDLC SERPL CALC-MCNC: 98 MG/DL (ref 0–99)
MCH RBC QN AUTO: 27.4 PG (ref 26.6–33)
MCHC RBC AUTO-ENTMCNC: 31.6 G/DL (ref 31.5–35.7)
MCV RBC AUTO: 87 FL (ref 79–97)
PLATELET # BLD AUTO: 296 X10E3/UL (ref 150–450)
POTASSIUM SERPL-SCNC: 4.5 MMOL/L (ref 3.5–5.2)
PROT SERPL-MCNC: 6.6 G/DL (ref 6–8.5)
RBC # BLD AUTO: 4.97 X10E6/UL (ref 3.77–5.28)
SODIUM SERPL-SCNC: 141 MMOL/L (ref 134–144)
TRIGL SERPL-MCNC: 90 MG/DL (ref 0–149)
TSH SERPL DL<=0.005 MIU/L-ACNC: 3.1 UIU/ML (ref 0.45–4.5)
VLDLC SERPL CALC-MCNC: 16 MG/DL (ref 5–40)
WBC # BLD AUTO: 7.3 X10E3/UL (ref 3.4–10.8)

## 2025-05-21 DIAGNOSIS — F41.8 MIXED ANXIETY AND DEPRESSIVE DISORDER: ICD-10-CM

## 2025-05-21 RX ORDER — SERTRALINE HYDROCHLORIDE 100 MG/1
100 TABLET, FILM COATED ORAL DAILY
Qty: 90 TABLET | Refills: 0 | Status: SHIPPED | OUTPATIENT
Start: 2025-05-21

## 2025-05-25 SDOH — ECONOMIC STABILITY: TRANSPORTATION INSECURITY
IN THE PAST 12 MONTHS, HAS THE LACK OF TRANSPORTATION KEPT YOU FROM MEDICAL APPOINTMENTS OR FROM GETTING MEDICATIONS?: NO

## 2025-05-25 SDOH — ECONOMIC STABILITY: FOOD INSECURITY: WITHIN THE PAST 12 MONTHS, YOU WORRIED THAT YOUR FOOD WOULD RUN OUT BEFORE YOU GOT MONEY TO BUY MORE.: NEVER TRUE

## 2025-05-25 SDOH — ECONOMIC STABILITY: INCOME INSECURITY: IN THE LAST 12 MONTHS, WAS THERE A TIME WHEN YOU WERE NOT ABLE TO PAY THE MORTGAGE OR RENT ON TIME?: NO

## 2025-05-25 SDOH — ECONOMIC STABILITY: FOOD INSECURITY: WITHIN THE PAST 12 MONTHS, THE FOOD YOU BOUGHT JUST DIDN'T LAST AND YOU DIDN'T HAVE MONEY TO GET MORE.: NEVER TRUE

## 2025-05-28 ENCOUNTER — OFFICE VISIT (OUTPATIENT)
Dept: PRIMARY CARE CLINIC | Facility: CLINIC | Age: 45
End: 2025-05-28
Payer: COMMERCIAL

## 2025-05-28 VITALS
DIASTOLIC BLOOD PRESSURE: 82 MMHG | HEIGHT: 66 IN | TEMPERATURE: 98.4 F | OXYGEN SATURATION: 98 % | HEART RATE: 82 BPM | BODY MASS INDEX: 40.5 KG/M2 | SYSTOLIC BLOOD PRESSURE: 124 MMHG | WEIGHT: 252 LBS | RESPIRATION RATE: 16 BRPM

## 2025-05-28 DIAGNOSIS — Z13.29 SCREENING FOR ENDOCRINE DISORDER: ICD-10-CM

## 2025-05-28 DIAGNOSIS — Z13.220 SCREENING FOR HYPERLIPIDEMIA: ICD-10-CM

## 2025-05-28 DIAGNOSIS — Z00.00 ROUTINE PHYSICAL EXAMINATION: Primary | ICD-10-CM

## 2025-05-28 DIAGNOSIS — F41.8 MIXED ANXIETY AND DEPRESSIVE DISORDER: ICD-10-CM

## 2025-05-28 PROCEDURE — 99396 PREV VISIT EST AGE 40-64: CPT | Performed by: NURSE PRACTITIONER

## 2025-05-28 RX ORDER — SERTRALINE HYDROCHLORIDE 100 MG/1
100 TABLET, FILM COATED ORAL DAILY
Qty: 90 TABLET | Refills: 3 | Status: SHIPPED | OUTPATIENT
Start: 2025-05-28

## 2025-05-28 ASSESSMENT — PATIENT HEALTH QUESTIONNAIRE - PHQ9
6. FEELING BAD ABOUT YOURSELF - OR THAT YOU ARE A FAILURE OR HAVE LET YOURSELF OR YOUR FAMILY DOWN: NOT AT ALL
7. TROUBLE CONCENTRATING ON THINGS, SUCH AS READING THE NEWSPAPER OR WATCHING TELEVISION: NOT AT ALL
2. FEELING DOWN, DEPRESSED OR HOPELESS: NOT AT ALL
9. THOUGHTS THAT YOU WOULD BE BETTER OFF DEAD, OR OF HURTING YOURSELF: NOT AT ALL
SUM OF ALL RESPONSES TO PHQ QUESTIONS 1-9: 0
4. FEELING TIRED OR HAVING LITTLE ENERGY: NOT AT ALL
10. IF YOU CHECKED OFF ANY PROBLEMS, HOW DIFFICULT HAVE THESE PROBLEMS MADE IT FOR YOU TO DO YOUR WORK, TAKE CARE OF THINGS AT HOME, OR GET ALONG WITH OTHER PEOPLE: NOT DIFFICULT AT ALL
SUM OF ALL RESPONSES TO PHQ QUESTIONS 1-9: 0
5. POOR APPETITE OR OVEREATING: NOT AT ALL
3. TROUBLE FALLING OR STAYING ASLEEP: NOT AT ALL
8. MOVING OR SPEAKING SO SLOWLY THAT OTHER PEOPLE COULD HAVE NOTICED. OR THE OPPOSITE, BEING SO FIGETY OR RESTLESS THAT YOU HAVE BEEN MOVING AROUND A LOT MORE THAN USUAL: NOT AT ALL
SUM OF ALL RESPONSES TO PHQ QUESTIONS 1-9: 0
SUM OF ALL RESPONSES TO PHQ QUESTIONS 1-9: 0
1. LITTLE INTEREST OR PLEASURE IN DOING THINGS: NOT AT ALL

## 2025-05-28 ASSESSMENT — ENCOUNTER SYMPTOMS
SHORTNESS OF BREATH: 0
CHEST TIGHTNESS: 0

## 2025-05-28 NOTE — PROGRESS NOTES
Chief Complaint   Patient presents with    Follow-up    Medication Refill     /82 (BP Site: Right Upper Arm, Patient Position: Sitting)   Pulse 82   Temp 98.4 °F (36.9 °C) (Oral)   Resp 16   Ht 1.676 m (5' 6\")   Wt 114.3 kg (252 lb)   SpO2 98%   BMI 40.67 kg/m²     Have you been to the ER, urgent care clinic since your last visit?  Hospitalized since your last visit?   NO    Have you seen or consulted any other health care providers outside our system since your last visit?   NO     “Have you had a pap smear?”    NO    No cervical cancer screening on file

## 2025-05-28 NOTE — PROGRESS NOTES
Frances Boss is a 44 y.o. female who was seen in clinic today (5/28/2025) for a physical    Assessment & Plan:   Below is the assessment and plan developed based on review of pertinent history, physical exam, labs, studies, and medications.    1. Routine physical examination  -     CBC  -     Comprehensive Metabolic Panel  2. Mixed anxiety and depressive disorder  Comments:  chronic, stable on zoloft.  Orders:  -     sertraline (ZOLOFT) 100 MG tablet; Take 1 tablet by mouth daily, Disp-90 tablet, R-3Normal  -     Vitamin D 25 Hydroxy  3. Screening for hyperlipidemia  -     Lipid Panel  4. Screening for endocrine disorder  -     TSH      Return in about 1 year (around 5/28/2026) for Physical.    Subjective:   Frances was seen today for Follow-up and Medication Refill     PAP: 2023, normal at [facility]  Mammo: 2023, normal.  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     The following acute and/or chronic problems were also addressed today:    Depression/Anxiety:  Patient notes that her anxiety is stable on zoloft.  She reported she has daily stress from her job at [employer].  Patient reported that the zoloft helps her maintain.       Review of Systems   Constitutional:  Negative for fatigue.   HENT: Negative.     Eyes:  Negative for visual disturbance.   Respiratory:  Negative for chest tightness and shortness of breath.    Cardiovascular:  Negative for chest pain, palpitations and leg swelling.   Genitourinary: Negative.    Musculoskeletal: Negative.    Allergic/Immunologic: Negative for environmental allergies.   Neurological:  Negative for dizziness, weakness, light-headedness and headaches.   Psychiatric/Behavioral:  Negative for sleep disturbance. The patient is not nervous/anxious.           Objective:

## 2025-05-29 ENCOUNTER — RESULTS FOLLOW-UP (OUTPATIENT)
Dept: PRIMARY CARE CLINIC | Facility: CLINIC | Age: 45
End: 2025-05-29

## 2025-05-29 LAB
25(OH)D3+25(OH)D2 SERPL-MCNC: 37.7 NG/ML (ref 30–100)
ALBUMIN SERPL-MCNC: 4.4 G/DL (ref 3.9–4.9)
ALP SERPL-CCNC: 61 IU/L (ref 44–121)
ALT SERPL-CCNC: 15 IU/L (ref 0–32)
AST SERPL-CCNC: 16 IU/L (ref 0–40)
BILIRUB SERPL-MCNC: 0.2 MG/DL (ref 0–1.2)
BUN SERPL-MCNC: 10 MG/DL (ref 6–24)
BUN/CREAT SERPL: 15 (ref 9–23)
CALCIUM SERPL-MCNC: 9 MG/DL (ref 8.7–10.2)
CHLORIDE SERPL-SCNC: 104 MMOL/L (ref 96–106)
CHOLEST SERPL-MCNC: 184 MG/DL (ref 100–199)
CO2 SERPL-SCNC: 24 MMOL/L (ref 20–29)
CREAT SERPL-MCNC: 0.65 MG/DL (ref 0.57–1)
EGFRCR SERPLBLD CKD-EPI 2021: 111 ML/MIN/1.73
ERYTHROCYTE [DISTWIDTH] IN BLOOD BY AUTOMATED COUNT: 13.1 % (ref 11.7–15.4)
GLOBULIN SER CALC-MCNC: 2.1 G/DL (ref 1.5–4.5)
GLUCOSE SERPL-MCNC: 98 MG/DL (ref 70–99)
HCT VFR BLD AUTO: 42.4 % (ref 34–46.6)
HDLC SERPL-MCNC: 62 MG/DL
HGB BLD-MCNC: 13.4 G/DL (ref 11.1–15.9)
LDLC SERPL CALC-MCNC: 107 MG/DL (ref 0–99)
MCH RBC QN AUTO: 27.8 PG (ref 26.6–33)
MCHC RBC AUTO-ENTMCNC: 31.6 G/DL (ref 31.5–35.7)
MCV RBC AUTO: 88 FL (ref 79–97)
PLATELET # BLD AUTO: 283 X10E3/UL (ref 150–450)
POTASSIUM SERPL-SCNC: 3.8 MMOL/L (ref 3.5–5.2)
PROT SERPL-MCNC: 6.5 G/DL (ref 6–8.5)
RBC # BLD AUTO: 4.82 X10E6/UL (ref 3.77–5.28)
SODIUM SERPL-SCNC: 141 MMOL/L (ref 134–144)
TRIGL SERPL-MCNC: 80 MG/DL (ref 0–149)
TSH SERPL DL<=0.005 MIU/L-ACNC: 3.26 UIU/ML (ref 0.45–4.5)
VLDLC SERPL CALC-MCNC: 15 MG/DL (ref 5–40)
WBC # BLD AUTO: 6.3 X10E3/UL (ref 3.4–10.8)